# Patient Record
(demographics unavailable — no encounter records)

---

## 2019-10-17 NOTE — RAD
EXAM: Single view of the chest



HISTORY:   Dyspnea and shortness of breath



COMPARISON: 6/11/2019



FINDINGS: Single view of the chest shows a normal sized cardiomediastinal silhouette.  The patient is
 status post CABG.  There are small bilateral pleural effusions, right greater than left. Adjacent

atelectasis versus infiltrate may be present in the right lung base. The bones are unremarkable.



IMPRESSION: 

1. Bilateral pleural effusions

2. Right basilar atelectasis versus infiltrate



Reported By: Mik Benavidez 

Electronically Signed:  10/17/2019 1:43 PM

## 2019-10-18 NOTE — HP
PRIMARY CARE PHYSICIAN:  Dr. Jaci Kruse.



CHIEF COMPLAINT:  Chest pain.



HISTORY OF PRESENT ILLNESS:  Ms. Almazan is a 50-year-old female with past medical

history of hypertension, hyperlipidemia, coronary artery disease status post CABG x3

vessels one year ago, peripheral vascular disease, diabetes mellitus type 2, who

presents to the ED earlier today due to worsening chest pain and shortness of breath

that started 2 days ago.  She is also complained of nausea, vomiting, diarrhea over

the last week.  She states that the pain is substernal and is worse with activity.

She was given nitroglycerin in the ED, which had helped with her symptoms.  During

her initial workup, her troponin was found to be negative x2.  Her BNP was elevated

at 609.4.  She states that she sees Dr. Burton as a cardiologist, which she states

that she has not seen him in quite a while.  She had a CABG in [place] in 2018.  She

otherwise denies any fever, chills, any headache, blurred vision, dizziness, any

palpitations, or abdominal pain.  Her portable chest x-ray revealed bilateral

pleural effusions and she states that she has noticed some swelling down her lower

extremities.  She states that Dr. Burton has been trying to get her set up to have

balloon and stents in her legs for her underlying PAD.  However, due to her [facility]

insurance, this has been a problem for her as they would like this to be done in

[place].  However, due to some problems with transportation, this has not been done

yet. 



REVIEW OF SYSTEMS:  All other systems reviewed and found to be negative unless

mentioned in the HPI. 



PAST MEDICAL HISTORY:  Hypertension, hyperlipidemia, coronary artery disease,

diabetes mellitus type 2, PAD, hepatitis C. 



PAST SURGICAL HISTORY:  Coronary artery bypass graft x3 vessels, cholecystectomy,

and thoracostomy. 



PSYCHIATRIC HISTORY:  Anxiety and depression.



SOCIAL HISTORY:  The patient denies alcohol, tobacco, or illicit drug use.



KNOWN ALLERGIES:  Codeine, penicillins, orange, and meperidine.



CURRENT HOME MEDICATIONS:  

1. Clopidogrel 75 mg daily.

2. Furosemide 60 mg daily.

3. Metformin 1000 mg b.i.d.

4. Aspirin 81 mg daily.

5. Atorvastatin 80 mg at bedtime.

6. Citalopram 40 mg daily.

7. Gabapentin 100 mg t.i.d.

8. Glimepiride 4 mg b.i.d.

9. Metoprolol 12.5 mg b.i.d.

10. Nitroglycerin 0.4 mg sublingual q.5 minutes as needed for chest pain.

11. Januvia 100 mg daily.

12. Tramadol 50 mg q.i.d. p.r.n. pain.



PHYSICAL EXAMINATION:

VITAL SIGNS:  Blood pressure 147/69, pulse 71, respirations 20, temperature 97.7, O2

saturation 99% on room air. 

GENERAL:  The patient is awake, alert, and oriented x3.  She is currently lying

comfortably in bed and in no acute distress. 

HEENT:  Atraumatic, normocephalic.  Pupils are round and reactive to light.

Extraocular muscles intact.  Moist mucous membranes noted. 

NECK:  Soft and supple.  Trachea midline. 

CARDIOVASCULAR:  Positive S1 and S2.  Regular rate and rhythm. 

LUNGS:  Clear to auscultation bilaterally without wheezes, rales, or rhonchi.

However, she does appear to have some mild diminished non-breath sounds at the

bases. 

ABDOMEN:  Soft, nontender.  Bowel sounds present. 

EXTREMITIES:  Moves all extremities equal.  Strength 5+ bilaterally upper and lower

extremities.  Trace edema noted in her bilateral lower extremities. 

NEUROLOGIC:  Cranial nerves 2 through 12 grossly intact.  No focal deficits noted.

Speech intact and normal.  Gait not assessed. 

SKIN:  Warm, dry, and intact.  No rashes.  No ulceration noted. 

PSYCHIATRIC:  Good mood and affect.



LABORATORY DATA:  WBC 8.4, RBC 3.91, hemoglobin 12.3, hematocrit 36.3, platelets

270.  Sodium 136, potassium 4.5, anion gap 14, BUN 12, creatinine 1.13, estimated

GFR 51, glucose 164.  Troponin less than 0.010 x2.  .4. 



DIAGNOSTIC IMAGING:  Portable chest x-ray showed bilateral pleural effusions, right

basilar atelectasis versus infiltrate. 



ASSESSMENT AND PLAN:  

1. Atypical chest pain, now her chest pain resolved after the use of nitroglycerin.

So far, her cardiac enzymes are negative x2.  She had a recent cardiac stress test

within the last 12 months in January 2019 which displayed scarring, otherwise

unremarkable.  Her primary cardiologist is Dr. Burton.  We will order an

echocardiogram due to her bump in BNP and also noted to be slightly fluid

overloaded, and we will treat with IV furosemide 40 mg. 

2. Congestive heart failure exacerbation and as above, we will check an

echocardiogram and treat symptomatically with IV Lasix along with her home regimen

at this time. 

3. Hypertension.

4. Hyperlipidemia.

5. Coronary artery disease.

6. Diabetes mellitus type 2.  Continue home regimen and add an insulin sliding scale

with frequent Accu-Cheks. 

7. History of peripheral artery disease.

8. Deep venous thrombosis and gastrointestinal prophylaxis.

9. Code status, full code.

10. Disposition, pending further workup and clinical findings.







Job ID:  [id number]

## 2019-10-19 NOTE — EKG
Test Reason : CP

Blood Pressure : ***/*** mmHG

Vent. Rate : 065 BPM     Atrial Rate : 065 BPM

   P-R Int : 124 ms          QRS Dur : 074 ms

    QT Int : 438 ms       P-R-T Axes : 052 009 079 degrees

   QTc Int : 455 ms

 

Normal sinus rhythm

Anterior infarct , age undetermined

Abnormal ECG

 

Confirmed by HANS NIEVES ([id number]),  JAMAAL CRONIN ([id number]) on 10/19/2019 3:48:04 PM

 

Referred By:             Confirmed By:HANS NIEVES

## 2020-01-15 NOTE — ULT
BILATERAL CAROTID DUPLEX ULTRASOUND:



HISTORY: Symptoms and signs involving circulatory system



TECHNIQUE:

Grayscale, color-flow and spectral Doppler ultrasound imaging of the extracranial carotid artery syst
ems was performed bilaterally.



FINDINGS:

A small amount of plaque is seen in the bifurcations.



The peak systolic velocity in the right ICA measures 67 cm/s with an end-diastolic velocity of 22 cm/
s and a systolic ratio of 0.95.



The peak systolic velocity in the left ICA measures  98  cm/s with an end-diastolic velocity of  35  
cm/s and a systolic ratio of 1.2.



Flow in both vertebral arteries remains antegrade.



IMPRESSION: No evidence of hemodynamically significant stenosis. 



Reported By: Gael Crawford 

Electronically Signed:  1/15/2020 11:09 AM